# Patient Record
Sex: FEMALE | Race: AMERICAN INDIAN OR ALASKA NATIVE
[De-identification: names, ages, dates, MRNs, and addresses within clinical notes are randomized per-mention and may not be internally consistent; named-entity substitution may affect disease eponyms.]

---

## 2018-04-12 ENCOUNTER — HOSPITAL ENCOUNTER (EMERGENCY)
Dept: HOSPITAL 5 - ED | Age: 28
Discharge: HOME | End: 2018-04-12
Payer: MEDICAID

## 2018-04-12 VITALS — SYSTOLIC BLOOD PRESSURE: 118 MMHG | DIASTOLIC BLOOD PRESSURE: 78 MMHG

## 2018-04-12 DIAGNOSIS — M25.541: Primary | ICD-10-CM

## 2018-04-12 DIAGNOSIS — M79.644: ICD-10-CM
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
## 2018-04-12 NOTE — XRAY REPORT
RIGHT FINGER RADIOGRAPHS



INDICATION: Right middle finger swelling.



COMPARISON: None similar at this institution.



FINDINGS: An AP view of the right hand with oblique and lateral 

projections of the right third digit demonstrate mild spurring medially 

at the base of the third distal phalanx and mild diffuse finger 

swelling.  No radiopaque foreign body.  Normal remainder exam.



CONCLUSION: Right third DIP joint mild spurring and mild diffuse finger 

soft tissue swelling suspected, as described.  Please correlate.



Thank you for the opportunity to participate in this patient's care.

## 2019-12-27 ENCOUNTER — HOSPITAL ENCOUNTER (INPATIENT)
Dept: HOSPITAL 5 - TRG | Age: 29
LOS: 2 days | Discharge: HOME | End: 2019-12-29
Attending: OBSTETRICS & GYNECOLOGY | Admitting: OBSTETRICS & GYNECOLOGY
Payer: COMMERCIAL

## 2019-12-27 DIAGNOSIS — D50.0: ICD-10-CM

## 2019-12-27 DIAGNOSIS — Z88.0: ICD-10-CM

## 2019-12-27 DIAGNOSIS — Z3A.40: ICD-10-CM

## 2019-12-27 LAB
BENZODIAZEPINES SCREEN,URINE: (no result)
HCT VFR BLD CALC: 32.8 % (ref 30.3–42.9)
HGB BLD-MCNC: 10.7 GM/DL (ref 10.1–14.3)
MCHC RBC AUTO-ENTMCNC: 33 % (ref 30–34)
MCV RBC AUTO: 79 FL (ref 79–97)
METHADONE SCREEN,URINE: (no result)
OPIATE SCREEN,URINE: (no result)
PLATELET # BLD: 153 K/MM3 (ref 140–440)
RBC # BLD AUTO: 4.16 M/MM3 (ref 3.65–5.03)

## 2019-12-27 PROCEDURE — 86850 RBC ANTIBODY SCREEN: CPT

## 2019-12-27 PROCEDURE — 87806 HIV AG W/HIV1&2 ANTB W/OPTIC: CPT

## 2019-12-27 PROCEDURE — 85018 HEMOGLOBIN: CPT

## 2019-12-27 PROCEDURE — 86706 HEP B SURFACE ANTIBODY: CPT

## 2019-12-27 PROCEDURE — 86592 SYPHILIS TEST NON-TREP QUAL: CPT

## 2019-12-27 PROCEDURE — 85014 HEMATOCRIT: CPT

## 2019-12-27 PROCEDURE — 86900 BLOOD TYPING SEROLOGIC ABO: CPT

## 2019-12-27 PROCEDURE — 86803 HEPATITIS C AB TEST: CPT

## 2019-12-27 PROCEDURE — 86901 BLOOD TYPING SEROLOGIC RH(D): CPT

## 2019-12-27 PROCEDURE — 80307 DRUG TEST PRSMV CHEM ANLYZR: CPT

## 2019-12-27 PROCEDURE — 85027 COMPLETE CBC AUTOMATED: CPT

## 2019-12-27 PROCEDURE — 36415 COLL VENOUS BLD VENIPUNCTURE: CPT

## 2019-12-27 PROCEDURE — 86762 RUBELLA ANTIBODY: CPT

## 2019-12-27 RX ADMIN — HYDROCODONE BITARTRATE AND ACETAMINOPHEN PRN EACH: 5; 325 TABLET ORAL at 10:42

## 2019-12-27 RX ADMIN — HYDROCODONE BITARTRATE AND ACETAMINOPHEN PRN EACH: 5; 325 TABLET ORAL at 17:04

## 2019-12-27 RX ADMIN — IBUPROFEN SCH MG: 600 TABLET, FILM COATED ORAL at 14:10

## 2019-12-27 RX ADMIN — IBUPROFEN SCH MG: 600 TABLET, FILM COATED ORAL at 05:47

## 2019-12-27 RX ADMIN — FENTANYL CITRATE PRN MCG: 50 INJECTION, SOLUTION INTRAMUSCULAR; INTRAVENOUS at 03:58

## 2019-12-27 RX ADMIN — FENTANYL CITRATE PRN MCG: 50 INJECTION, SOLUTION INTRAMUSCULAR; INTRAVENOUS at 01:33

## 2019-12-27 RX ADMIN — SENNOSIDES AND DOCUSATE SODIUM SCH TAB: 50; 8.6 TABLET ORAL at 17:05

## 2019-12-27 NOTE — PROCEDURE NOTE
OB Delivery Note





- Delivery


Date of Delivery: 19


Surgeon: EWA SPRAGUE


Estimated blood loss: 200cc





- Vaginal


Delivery position: OA


Intrapartum events: meconium


Delivery induction: none


Delivery augmentation: rupture of membranes


Delivery monitor: external FHT


Route of delivery: 


Delivery placenta: spontaneous


Delivery cord: 3 umbilical vessels


Episiotomy: none


Delivery laceration: none


Anesthesia: none


Delivery comments: 





Anterior shoulder delivered without difficulty.  Baby bulb suctioned at the 

perineum and again after delivery.  Cord was clamped and cut and baby sent to 

the warmer.  Placenta delivered spontaneously after the cord blood was obtained.

 No lacerations noted.  Good hemostasis.  Mother and baby are stable.





- Infant


  ** A


Apgar at 1 minute: 8


Apgar at 5 minutes: 9


Infant Gender: Female

## 2019-12-27 NOTE — HISTORY AND PHYSICAL REPORT
History of Present Illness


Date of examination: 19


Date of admission: 


19 00:38





History of present illness: 





PT is a walk in pt who has prenatal care elsewhere who presents in labor.  Pt 

with ctxs and 5 cm on admission.  No LOF or VB.  Good FM.  Pt denies any issues 

during the pregnancy but has not seen by her provider since november.  





Past History


Past Surgical History: no surgical history


Social history: no significant social history





- Obstetrical History


Expected Date of Delivery: 19


Actual Gestation: 40 Week(s) 1 Day(s) 


: 4


Para: 3


Hx # Term Pregnancies: 3


Number of Living Children: 3





Medications and Allergies


                                    Allergies











Allergy/AdvReac Type Severity Reaction Status Date / Time


 


Penicillins Allergy  Anaphylaxis Verified 19 00:56











                                Home Medications











 Medication  Instructions  Recorded  Confirmed  Last Taken  Type


 


Acetaminophen [Acetaminophen TAB] 500 mg PO TID #30 tablet 18  Unknown Rx











Active Meds: 


Active Medications





Butorphanol Tartrate (Stadol)  2 mg IV Q2H PRN


   PRN Reason: Pain , Severe (7-10)


Ephedrine Sulfate (Ephedrine Sulfate)  10 mg IV Q2M PRN


   PRN Reason: Hypotension


Fentanyl (Sublimaze)  100 mcg IV Q2H PRN


   PRN Reason: Labor Pain


   Last Admin: 19 03:58 Dose:  100 mcg


   Documented by: 


Oxytocin/Sodium Chloride (Pitocin/Ns 20 Unit/1000ml Drip)  20 units in 1,000 mls

@ 125 mls/hr IV AS DIRECT NALINI


Lactated Ringer's (Lactated Ringers)  1,000 mls @ 125 mls/hr IV AS DIRECT NALINI


Clindamycin HCl (Cleocin 900 Mg/50 Ml)  900 mg in 50 mls @ 100 mls/hr IV Q8H 

[facility]; Protocol


   Last Admin: 19 01:33 Dose:  100 mls/hr


   Documented by: 


Mineral Oil (Mineral Oil)  30 ml PO QHS PRN


   PRN Reason: Constipation


Terbutaline Sulfate (Brethine)  0.25 mg SUB-Q ONCE PRN


   PRN Reason: Hyperstimulation/Hypertonicity


Terbutaline Sulfate (Brethine)  0.25 mg IVP ONCE PRN


   PRN Reason: Hyperstimulation/Hypertonicity











Review of Systems


All systems: negative (except HPI)





- Vital Signs


Vital signs: 


                                   Vital Signs











Temp Resp


 


 98.2 F   20 


 


 19 00:20  19 00:20








                                        











Temp Pulse Resp BP Pulse Ox


 


 98.2 F   76   18   125/78    


 


 19 00:20  19 04:29  19 01:33  19 04:29   














- Physical Exam


Abdomen: Positive: normal appearance, soft.  Negative: tenderness





- Obstetrical


FHR: category 1


Uterine Contraction Monitor Mode: External


Uterine Contraction Pattern: Regular


Uterine Contraction Intensity: Strong/Firm





Results


Result Diagrams: 


                                 19 00:45





                              Abnormal lab results











  19 Range/Units





  00:45 


 


WBC  4.3 L  (4.5-11.0)  K/mm3


 


MCH  26 L  (28-32)  pg








All other labs normal.








Assessment and Plan





- Patient Problems


(1) Active labor at term


Current Visit: Yes   Status: Acute   


Plan to address problem: 


expectant care.

## 2019-12-28 LAB
HCT VFR BLD CALC: 27.8 % (ref 30.3–42.9)
HGB BLD-MCNC: 9.1 GM/DL (ref 10.1–14.3)

## 2019-12-28 RX ADMIN — IBUPROFEN SCH: 600 TABLET, FILM COATED ORAL at 12:00

## 2019-12-28 RX ADMIN — FERROUS SULFATE TAB 325 MG (65 MG ELEMENTAL FE) SCH MG: 325 (65 FE) TAB at 22:43

## 2019-12-28 RX ADMIN — HYDROCODONE BITARTRATE AND ACETAMINOPHEN PRN EACH: 5; 325 TABLET ORAL at 09:48

## 2019-12-28 RX ADMIN — SENNOSIDES AND DOCUSATE SODIUM SCH TAB: 50; 8.6 TABLET ORAL at 06:07

## 2019-12-28 RX ADMIN — SENNOSIDES AND DOCUSATE SODIUM SCH TAB: 50; 8.6 TABLET ORAL at 17:07

## 2019-12-28 RX ADMIN — IBUPROFEN SCH MG: 600 TABLET, FILM COATED ORAL at 16:59

## 2019-12-28 RX ADMIN — IBUPROFEN SCH MG: 600 TABLET, FILM COATED ORAL at 06:08

## 2019-12-28 RX ADMIN — IBUPROFEN SCH MG: 600 TABLET, FILM COATED ORAL at 00:14

## 2019-12-28 RX ADMIN — HYDROCODONE BITARTRATE AND ACETAMINOPHEN PRN EACH: 5; 325 TABLET ORAL at 20:19

## 2019-12-28 RX ADMIN — FERROUS SULFATE TAB 325 MG (65 MG ELEMENTAL FE) SCH MG: 325 (65 FE) TAB at 09:44

## 2019-12-28 NOTE — PROGRESS NOTE
Assessment and Plan


A: Postpartum/postop day 1 S/P .


Anemia secondary to pregnancy and blood loss. 


P: Encouraged ambulation. 


Iron supplementation. 








Subjective





- Subjective


Date of service: 19


Principal diagnosis: Postpartum day 1 S/P 


Interval history: 


Postpartum day 1 S/P .


Patient is doing well. Reports a small amount of lochia. Voiding without 

difficulty, ambulating well, tolerating a regular diet, passing gas. 


Patient denies headache, cough, chest pain, shortness of breath, abdominal pain,

leg pain, heavy bleeding, or nausea/vomiting. 





Patient reports: appetite normal, voiding normally, pain well controlled, 

flatus, ambulating normally, no dizzy ambulation, no nauseated


: doing well





Objective





- Vital Signs


Latest vital signs: 


                                   Vital Signs











  Temp Pulse Resp BP Pulse Ox


 


 19 10:48    18  


 


 19 09:48    18  


 


 19 07:58  98.3 F  63  18  117/76  100


 


 19 07:08    18  


 


 19 06:08    18  


 


 19 01:14    18  


 


 19 00:14    18  


 


 19 23:56  97.5 F L  70  20  101/61  100


 


 19 18:07  98.1 F  77  18  115/68  100








                                Intake and Output











 19





 23:59 07:59 15:59


 


Intake Total 960 240 240


 


Balance 960 240 240


 


Intake:   


 


  Oral 480 240 240


 


  Intake, Free [place] 480  


 


Other:   


 


  Total, Intake Amount 480 240 240


 


  # Voids   


 


    Void  1 1














- Exam


Cardiovascular: Present: Regular rate, Normal S1, Normal S2


Lungs: Present: Clear to auscultation


Abdomen: Present: normal appearance, soft, normal bowel sounds.  Absent: 

distention, tenderness, guarding, rigidity


Uterus: Present: normal, firm, fundal height below umbilicus.  Absent: 

bogginess, tenderness


Extremities: Present: normal.  Absent: tenderness, edema





- Labs


Labs: 


                              Abnormal lab results











  19 Range/Units





  07:37 


 


Hgb  9.1 L  (10.1-14.3)  gm/dl


 


Hct  27.8 L  (30.3-42.9)  %

## 2019-12-29 VITALS — DIASTOLIC BLOOD PRESSURE: 83 MMHG | SYSTOLIC BLOOD PRESSURE: 135 MMHG

## 2019-12-29 RX ADMIN — SENNOSIDES AND DOCUSATE SODIUM SCH TAB: 50; 8.6 TABLET ORAL at 07:05

## 2019-12-29 RX ADMIN — IBUPROFEN SCH MG: 600 TABLET, FILM COATED ORAL at 00:57

## 2019-12-29 RX ADMIN — IBUPROFEN SCH MG: 600 TABLET, FILM COATED ORAL at 11:22

## 2019-12-29 RX ADMIN — IBUPROFEN SCH MG: 600 TABLET, FILM COATED ORAL at 07:05

## 2019-12-29 RX ADMIN — FERROUS SULFATE TAB 325 MG (65 MG ELEMENTAL FE) SCH MG: 325 (65 FE) TAB at 11:22

## 2019-12-29 NOTE — DISCHARGE SUMMARY
Providers





- Providers


Date of Admission: 


19 00:38





Date of discharge: 19


Attending physician: 


EWA SPRAGUE





Primary care physician: 


EWA SPRAGUE








Hospitalization


Reason for admission: active labor


Delivery: 


Episiotomy: none


Other postpartum procedures: none


Postpartum complications: none


Discharge diagnosis: IUP at term delivered


 baby: female


Pertinent studies: 


Labs





Hospital course: 


Normal hospital course. 





Condition at discharge: Good


Disposition: DC-01 TO HOME OR SELFCARE





- Discharge Diagnoses


(1) Term pregnancy delivered


Status: Acute   





(2) Anemia due to blood loss


Status: Acute   





Plan





- Provider Discharge Summary


Activity: routine, no sex for 6 weeks, no heavy lifting 4 weeks, no strenuous 

exercise


Diet: routine


Instructions: routine


Additional instructions: 


Continue taking your prenatal vitamins and iron supplements at home. 


Call your doctor immediately for:


* Fever > 100.5


* Heavy vaginal bleeding ( >1 pad per hour)


* Severe persistent headache


* Shortness of breath


* Reddened, hot, painful area to leg or breast











- Follow up plan


Follow up: 


EWA SPRAGUE MD [Primary Care Provider] - 6 Weeks


Forms:  [facility] Discharge Summary

## 2019-12-29 NOTE — PROGRESS NOTE
Assessment and Plan


A: Postpartum day 2 S/P spontaneous vaginal delivery. 


Anemia secondary to pregnancy and blood loss. 


P: Discharge patient home today. Postpartum discharge instructions and warning 

signs discussed with patient. Advised patient to continue taking her prenatal 

vitamins and iron supplements at home. Advised patient to avoid intercourse, 

lifting and heavy housework and driving. Advised patient to follow up at [facility] in 6 weeks for postpartum exam (advised patient to call the office 

tomorrow morning to obtain an appointment). Patient voiced understanding of all 

instructions. 








Subjective





- Subjective


Date of service: 19


Principal diagnosis: Postpartum day 2 S/P 


Interval history: 


Postpartum day 2 S/P . Patient desires discharge today. 


Patient is doing well. Reports a small amount of lochia. Voiding without 

difficulty, ambulating well, tolerating a regular diet, passing gas. 


Patient denies headache, cough, chest pain, shortness of breath, dizziness, 

fatigue, abdominal pain, leg pain, heavy bleeding, or nausea/vomiting. 





Patient reports: appetite normal, voiding normally, pain well controlled, 

flatus, ambulating normally, no dizzy ambulation, no nauseated


[place]: doing well





Objective





- Vital Signs


Latest vital signs: 


                                   Vital Signs











  Temp Pulse Resp BP BP Pulse Ox


 


 19 09:05  98.0 F  68  18  124/75   100


 


 19 00:00  98.3 F  72  20   128/71  99


 


 19 18:03  98.3 F  65  18  123/76   100


 


 19 10:48    18   








                                Intake and Output











 19





 23:59 07:59 15:59


 


Intake Total 1560 600 240


 


Balance 1560 600 240


 


Intake:   


 


  Oral 840 600 240


 


  Intake, Free Water 720  


 


Other:   


 


  Total, Intake Amount 240 240 240


 


  # Voids   


 


    Void 4 1 1














- Exam


Cardiovascular: Present: Regular rate, Normal S1, Normal S2


Lungs: Present: Clear to auscultation


Abdomen: Present: normal appearance, soft, normal bowel sounds.  Absent: 

distention, tenderness, guarding, rigidity


Uterus: Present: normal, firm, fundal height below umbilicus.  Absent: 

bogginess, tenderness


Extremities: Present: normal.  Absent: tenderness, edema